# Patient Record
Sex: FEMALE | Race: WHITE | URBAN - METROPOLITAN AREA
[De-identification: names, ages, dates, MRNs, and addresses within clinical notes are randomized per-mention and may not be internally consistent; named-entity substitution may affect disease eponyms.]

---

## 2018-07-09 ENCOUNTER — TELEPHONE (OUTPATIENT)
Dept: FAMILY MEDICINE CLINIC | Age: 33
End: 2018-07-09

## 2018-07-09 NOTE — TELEPHONE ENCOUNTER
Patient is having issues with her allergies.  She is 12 weeks pregnant and is asking if she can take Zyrtec or Claritin

## 2018-09-05 ENCOUNTER — TELEPHONE (OUTPATIENT)
Dept: FAMILY MEDICINE CLINIC | Age: 33
End: 2018-09-05

## 2018-09-05 NOTE — TELEPHONE ENCOUNTER
Pt called and having N & V. Pt is 20 weeks pregnant. Was wanting to know if there is something she can take.     Thank you

## 2019-04-02 ENCOUNTER — OFFICE VISIT (OUTPATIENT)
Dept: FAMILY MEDICINE CLINIC | Age: 34
End: 2019-04-02

## 2019-04-02 VITALS
OXYGEN SATURATION: 98 % | WEIGHT: 193.6 LBS | HEIGHT: 67 IN | DIASTOLIC BLOOD PRESSURE: 70 MMHG | TEMPERATURE: 97.6 F | SYSTOLIC BLOOD PRESSURE: 122 MMHG | HEART RATE: 86 BPM | BODY MASS INDEX: 30.39 KG/M2

## 2019-04-02 DIAGNOSIS — J02.9 SORE THROAT: ICD-10-CM

## 2019-04-02 DIAGNOSIS — O91.23 NONPURULENT MASTITIS ASSOCIATED WITH LACTATION: Primary | ICD-10-CM

## 2019-04-02 LAB — S PYO AG THROAT QL: NORMAL

## 2019-04-02 PROCEDURE — 99213 OFFICE O/P EST LOW 20 MIN: CPT | Performed by: NURSE PRACTITIONER

## 2019-04-02 PROCEDURE — 87880 STREP A ASSAY W/OPTIC: CPT | Performed by: NURSE PRACTITIONER

## 2019-04-02 RX ORDER — CEPHALEXIN 500 MG/1
500 CAPSULE ORAL 4 TIMES DAILY
Qty: 40 CAPSULE | Refills: 0 | Status: SHIPPED | OUTPATIENT
Start: 2019-04-02 | End: 2019-04-11 | Stop reason: SDUPTHER

## 2019-04-02 ASSESSMENT — ENCOUNTER SYMPTOMS
SORE THROAT: 1
RESPIRATORY NEGATIVE: 1
GASTROINTESTINAL NEGATIVE: 1

## 2019-04-02 ASSESSMENT — PATIENT HEALTH QUESTIONNAIRE - PHQ9
2. FEELING DOWN, DEPRESSED OR HOPELESS: 0
SUM OF ALL RESPONSES TO PHQ QUESTIONS 1-9: 0
SUM OF ALL RESPONSES TO PHQ9 QUESTIONS 1 & 2: 0
SUM OF ALL RESPONSES TO PHQ QUESTIONS 1-9: 0
1. LITTLE INTEREST OR PLEASURE IN DOING THINGS: 0

## 2019-04-02 NOTE — PATIENT INSTRUCTIONS
Please read the healthy family handout that you were given and share it with your family. Please compare this printed medication list with your medications at home to be sure they are the same. If you have any medications that are different please contact us immediately at [phone]. Also review your allergies that we have listed, these may also include medications that you have not been able to tolerate, make sure everything listed is correct. If you have any allergies that are different please contact us immediately at [phone]. Patient Education        Mastitis: Care Instructions  Your Care Instructions  Mastitis is an inflammation of the breast. It occurs most often in women who are breastfeeding, but it can affect any woman. Mastitis can be caused by poor milk flow from the breast. When milk builds up in a breast, it leaks into the nearby breast tissue. Infection can also develop when the nipples become cracked or irritated. The tissue can then become infected with bacteria. Antibiotics can usually cure mastitis. For women who are nursing, continued breastfeeding (or pumping) can help. If mastitis is not treated, a pocket of pus may form in the breast and need to be drained. Follow-up care is a key part of your treatment and safety. Be sure to make and go to all appointments, and call your doctor if you are having problems. It's also a good idea to know your test results and keep a list of the medicines you take. How can you care for yourself at home? · If your doctor prescribed antibiotics, take them as directed. Do not stop taking them just because you feel better. You need to take the full course of antibiotics. · If you are breastfeeding, continue breastfeeding or pumping breast milk. It is important to empty your breasts regularly, every 2 to 3 hours while you are awake. These tips may help:  ? Before breastfeeding, place a warm, wet washcloth over your breast for about 15 minutes.  Try this at least 3 times a day. This increases milk flow in the breast. Massaging the affected breast may also increase milk flow. ? Breastfeed on both sides. Try to start with your healthy breast first. Then, after your milk is flowing, breastfeed from the affected breast until it feels soft. You should empty this breast completely. Then switch back to the healthy breast and breastfeed until your baby has finished. ? Pump or hand-express a small amount of breast milk before breastfeeding if your breasts are too full with milk. This will make your breasts less full and may make it easier for your baby to latch on to your breast.  ? Pump or express milk from the affected breast if it hurts too much to breastfeed. · Take an over-the-counter pain medicine, such as acetaminophen (Tylenol) or ibuprofen (Advil, Motrin) to relieve pain and fever. Read and follow all instructions on the label. · Do not take two or more pain medicines at the same time unless the doctor told you to. Many pain medicines have acetaminophen, which is Tylenol. Too much acetaminophen (Tylenol) can be harmful. · Rest as much as possible. · Drink extra fluids. · If pus is draining from your infected breast, wash the nipple gently and let it air-dry before you put your bra back on. A disposable breast pad placed in the bra cup may absorb the pus. When should you call for help? Call your doctor now or seek immediate medical care if:    · You have worse symptoms of a breast infection, such as:  ? Increased pain, swelling, redness, or warmth around a breast.  ? Red streaks leading from a breast.  ? Pus draining from a breast.  ? A fever.    Watch closely for changes in your health, and be sure to contact your doctor if:    · You do not get better as expected. Where can you learn more? Go to https://chjoseph.Frevvo. org and sign in to your [employer] account.  Enter Y207 in the Vericept box to learn more about \"Mastitis: Care Instructions. \"     If you do not have an account, please click on the \"Sign Up Now\" link. Current as of: September 5, 2018  Content Version: 11.9  © 1999-5017 [employer]. Care instructions adapted under license by [facility] ([facility]). If you have questions about a medical condition or this instruction, always ask your healthcare professional. [address] any warranty or liability for your use of this information. Patient Education        Sore Throat: Care Instructions  Your Care Instructions    Infection by bacteria or a virus causes most sore throats. Cigarette smoke, dry air, air pollution, allergies, and yelling can also cause a sore throat. Sore throats can be painful and annoying. Fortunately, most sore throats go away on their own. If you have a bacterial infection, your doctor may prescribe antibiotics. Follow-up care is a key part of your treatment and safety. Be sure to make and go to all appointments, and call your doctor if you are having problems. It's also a good idea to know your test results and keep a list of the medicines you take. How can you care for yourself at home? · If your doctor prescribed antibiotics, take them as directed. Do not stop taking them just because you feel better. You need to take the full course of antibiotics. · Gargle with warm salt water once an hour to help reduce swelling and relieve discomfort. Use 1 teaspoon of salt mixed in 1 cup of warm water. · Take an over-the-counter pain medicine, such as acetaminophen (Tylenol), ibuprofen (Advil, Motrin), or naproxen (Aleve). Read and follow all instructions on the label. · Be careful when taking over-the-counter cold or flu medicines and Tylenol at the same time. Many of these medicines have acetaminophen, which is Tylenol. Read the labels to make sure that you are not taking more than the recommended dose. Too much acetaminophen (Tylenol) can be harmful.   · Drink plenty of fluids. Fluids may help soothe an irritated throat. Hot fluids, such as tea or soup, may help decrease throat pain. · Use over-the-counter throat lozenges to soothe pain. Regular cough drops or hard candy may also help. These should not be given to young children because of the risk of choking. · Do not smoke or allow others to smoke around you. If you need help quitting, talk to your doctor about stop-smoking programs and medicines. These can increase your chances of quitting for good. · Use a vaporizer or humidifier to add moisture to your bedroom. Follow the directions for cleaning the machine. When should you call for help? Call your doctor now or seek immediate medical care if:    · You have new or worse trouble swallowing.     · Your sore throat gets much worse on one side.    Watch closely for changes in your health, and be sure to contact your doctor if you do not get better as expected. Where can you learn more? Go to https://frintit.Adylitica. org and sign in to your [employer] account. Enter S163 in the Fareye box to learn more about \"Sore Throat: Care Instructions. \"     If you do not have an account, please click on the \"Sign Up Now\" link. Current as of: March 27, 2018  Content Version: 11.9  © 0540-7708 [employer]. Care instructions adapted under license by [facility] ([facility]). If you have questions about a medical condition or this instruction, always ask your healthcare professional. [place] [address] any warranty or liability for your use of this information. Patient Education        cephalexin  Pronunciation:  sef a VERENICE in  Brand:  [employer]ily [employer]  What is the most important information I should know about cephalexin? You should not use this medicine if you are allergic to cephalexin or to similar antibiotics, such as Ceftin, Cefzil, Omnicef, and others.  Tell your doctor if you are allergic to any drugs, especially penicillins or other antibiotics. What is cephalexin? Cephalexin is a cephalosporin (SEF a low spor in) antibiotic. It works by fighting bacteria in your body. Cephalexin is used to treat infections caused by bacteria, including upper respiratory infections, ear infections, skin infections, and urinary tract infections. Cephalexin may also be used for purposes not listed in this medication guide. What should I discuss with my healthcare provider before taking cephalexin? Do not use this medicine if you are allergic to cephalexin or to other cephalosporin antibiotics, such as:  · cefaclor (Raniclor);  · cefadroxil (Duricef); · cefazolin (Ancef); · cefdinir (Omnicef); · cefditoren (Spectracef); · cefpodoxime (Vantin);  · cefprozil (Cefzil);  · ceftibuten (Cedax);  · cefuroxime (Ceftin); or  · cephradine (Velosef), and others. To make sure cephalexin is safe for you, tell your doctor if you have:  · an allergy to any drugs (especially penicillins);  · kidney disease; or  · a history of intestinal problems, such as colitis. The liquid form of cephalexin may contain sugar. This may affect you if you have diabetes. Cephalexin is not expected to be harmful to an unborn baby. Tell your doctor if you are pregnant. Cephalexin can pass into breast milk and may harm a nursing baby. Tell your doctor if you are breast-feeding a baby. How should I take cephalexin? Follow all directions on your prescription label. Do not take this medicine in larger or smaller amounts or for longer than recommended. Do not use cephalexin to treat any condition that has not been checked by your doctor. Shake the oral suspension (liquid) well just before you measure a dose. Measure liquid medicine with the dosing syringe provided, or with a special dose-measuring spoon or medicine cup. If you do not have a dose-measuring device, ask your pharmacist for one. Use this medicine for the full prescribed length of time.  Your symptoms may improve before the infection is completely cleared. Skipping doses may also increase your risk of further infection that is resistant to antibiotics. Cephalexin will not treat a viral infection such as the flu or a common cold. Do not share cephalexin with another person, even if they have the same symptoms you have. This medication can cause you to have unusual results with certain medical tests. Tell any doctor who treats you that you are using cephalexin. Store the tablets and capsules at room temperature away from moisture, heat, and light. Store the liquid medicine in the refrigerator. Throw away any unused liquid after 14 days. What happens if I miss a dose? Take the missed dose as soon as you remember. Skip the missed dose if it is almost time for your next scheduled dose. Do not take extra medicine to make up the missed dose. What happens if I overdose? Seek emergency medical attention or call the Poison Help line at 1-701.928.3388. Overdose symptoms may include nausea, vomiting, stomach pain, diarrhea, and blood in your urine. What should I avoid while taking cephalexin? Antibiotic medicines can cause diarrhea, which may be a sign of a new infection. If you have diarrhea that is watery or bloody, call your doctor. Do not use anti-diarrhea medicine unless your doctor tells you to. What are the possible side effects of cephalexin? Get emergency medical help if you have signs of an allergic reaction: hives; difficult breathing; swelling of your face, lips, tongue, or throat.   Call your doctor at once if you have:  · severe stomach pain, diarrhea that is watery or bloody;  · jaundice (yellowing of the skin or eyes);  · easy bruising, unusual bleeding (nose, mouth, vagina, or rectum), purple or red pinpoint spots under your skin;  · little or no urination;  · agitation, confusion, hallucinations; or  · severe skin reaction --fever, sore throat, swelling in your face or tongue, burning in your eyes, skin pain followed by a red or purple skin rash that spreads (especially in the face or upper body) and causes blistering and peeling. Common side effects may include:  · diarrhea;  · dizziness, feeling tired;  · headache, joint pain; or  · vaginal itching or discharge. This is not a complete list of side effects and others may occur. Call your doctor for medical advice about side effects. You may report side effects to FDA at 7-781-NGQ-6599. What other drugs will affect cephalexin? Other drugs may interact with cephalexin, including prescription and over-the-counter medicines, vitamins, and herbal products. Tell each of your health care providers about all medicines you use now and any medicine you start or stop using. Where can I get more information? Your pharmacist can provide more information about cephalexin. Remember, keep this and all other medicines out of the reach of children, never share your medicines with others, and use this medication only for the indication prescribed. Every effort has been made to ensure that the information provided by [address] is accurate, up-to-date, and complete, but no guarantee is made to that effect. Drug information contained herein may be time sensitive. burrp! information has been compiled for use by healthcare practitioners and consumers in the [place] and therefore [employer] does not warrant that uses outside of the [place] are appropriate, unless specifically indicated otherwise. [facility] drug information does not endorse drugs, diagnose patients or recommend therapy. [facility] drug information is an informational resource designed to assist licensed healthcare practitioners in caring for their patients and/or to serve consumers viewing this service as a supplement to, and not a substitute for, the expertise, skill, knowledge and judgment of healthcare practitioners.  The absence of a warning for a given drug or drug combination in no way should be construed to indicate that the drug or drug combination is safe, effective or appropriate for any given patient. [facility] does not assume any responsibility for any aspect of healthcare administered with the aid of information [facility] provides. The information contained herein is not intended to cover all possible uses, directions, precautions, warnings, drug interactions, allergic reactions, or adverse effects. If you have questions about the drugs you are taking, check with your doctor, nurse or pharmacist.  Copyright 9531-3723 [address]. Version: 9.01. Revision date: 4/18/2017. Care instructions adapted under license by [facility] ([facility]). If you have questions about a medical condition or this instruction, always ask your healthcare professional. [place] [address] any warranty or liability for your use of this information.

## 2019-04-02 NOTE — PROGRESS NOTES
Subjective:      Patient ID: Monika Herndon is a 34 y.o. female. HPI    Chief Complaint   Patient presents with    Pharyngitis     Sore Throat  Patient complains of sore throat. Associated symptoms include post nasal drip, sore throat and mild nasal drainage. Onset of symptoms was 1 day ago, gradually worsening since that time. She is drinking moderate amounts of fluids. She has not had recent close exposure to someone with proven streptococcal pharyngitis. Patient also with c/o right breast redness, swelling and pain X 1 day. Patient is currently breast feeding. Review of Systems   Constitutional: Negative for appetite change, chills and fever. HENT: Positive for sore throat. Respiratory: Negative. Cardiovascular: Negative. Gastrointestinal: Negative. Genitourinary: Negative. Musculoskeletal: Negative. Skin: Negative. Neurological: Negative. Hematological: Negative. Psychiatric/Behavioral: Positive for sleep disturbance (breast feeding 1month old infant). Patient Active Problem List   Diagnosis    Irritable bowel syndrome    Acne    Allergic rhinoconjunctivitis    Stress reaction    Seizure disorder ([facility] [address]    Migraine with aura and without status migrainosus    Fatty liver       No outpatient medications have been marked as taking for the 4/2/19 encounter (Office Visit) with Scherrie Gosselin, APRN - CNP.        Allergies   Allergen Reactions    Ciprofloxacin Diarrhea    Erythromycin      N & V    Morphine Hives    Sumatriptan     Lamictal [Lamotrigine] Rash       Social History     Tobacco Use    Smoking status: Never Smoker    Smokeless tobacco: Never Used   Substance Use Topics    Alcohol use: No       Objective:   /70   Pulse 86   Temp 97.6 °F (36.4 °C) (Oral)   Ht 5' 7\" (1.702 m)   Wt 193 lb 9.6 oz (87.8 kg)   SpO2 98%   BMI 30.32 kg/m²     Physical Exam   Constitutional: Vital signs are normal. She appears well-developed and well-nourished. She is cooperative. She does not have a sickly appearance. No distress. HENT:   Head: Normocephalic and atraumatic. Right Ear: Tympanic membrane, external ear and ear canal normal.   Left Ear: Tympanic membrane, external ear and ear canal normal.   Nose: Nose normal.   Mouth/Throat: Uvula is midline and mucous membranes are normal. Posterior oropharyngeal erythema present. No oropharyngeal exudate. Neck: Normal range of motion. Cardiovascular: Normal rate and regular rhythm. Pulmonary/Chest: Effort normal and breath sounds normal. Right breast exhibits skin change (eyrthema, warmth) and tenderness. Abdominal: Soft. Bowel sounds are normal.   Neurological: She is alert. Skin: Skin is warm and dry. Psychiatric: She has a normal mood and affect. Assessment/Plan:   1. Nonpurulent mastitis associated with lactation  Patient presents today with complaints of right wrist tenderness, swelling, redness and warmth ×1 day. Patient is breast feeding her 4 month old infant son. On exam noted right upper quadrant of right breast with mild erythema and warmth to touch. No edema noted. Advised to increase breast-feeding, increase fluid intake, may do warm compresses and take antibiotics as below. Assured patient cephalexin is considered to be safe while breast-feeding. Advised patient to follow-up if no better or worsening of symptoms. Patient verbalized understanding and agreeable to plan. - cephALEXin (KEFLEX) 500 MG capsule; Take 1 capsule by mouth 4 times daily for 10 days  Dispense: 40 capsule; Refill: 0    2. Sore throat  Patient also with complaints of sore throat ×1 day with associated mild nasal congestion. Rapid strep is negative. Possibly viral in nature. Advised to drink plenty of fluids, do warm salt water gargles 2-3 times a day and patient to follow-up if no better or worsening of symptoms. Patient verbalized understanding and agreeable to plan.   - POCT rapid

## 2019-04-11 ENCOUNTER — TELEPHONE (OUTPATIENT)
Dept: FAMILY MEDICINE CLINIC | Age: 34
End: 2019-04-11

## 2019-04-11 DIAGNOSIS — O91.23 NONPURULENT MASTITIS ASSOCIATED WITH LACTATION: ICD-10-CM

## 2019-04-11 RX ORDER — CEPHALEXIN 500 MG/1
500 CAPSULE ORAL 4 TIMES DAILY
Qty: 28 CAPSULE | Refills: 0 | Status: SHIPPED | OUTPATIENT
Start: 2019-04-11 | End: 2019-04-11 | Stop reason: SDUPTHER

## 2019-04-11 RX ORDER — CEPHALEXIN 500 MG/1
500 CAPSULE ORAL 4 TIMES DAILY
Qty: 28 CAPSULE | Refills: 0 | Status: SHIPPED | OUTPATIENT
Start: 2019-04-11 | End: 2019-04-18

## 2019-04-11 NOTE — TELEPHONE ENCOUNTER
Patient was seen in the office on 4/2 with mastitis and was prescribed Keflex for 10 days. Her symptoms improved knot never went away completely. Patient starting having pain again last night.   Can you call abx for her again

## 2019-10-31 ENCOUNTER — TELEPHONE (OUTPATIENT)
Dept: FAMILY MEDICINE CLINIC | Age: 34
End: 2019-10-31

## 2019-10-31 RX ORDER — ONDANSETRON 4 MG/1
4 TABLET, ORALLY DISINTEGRATING ORAL 3 TIMES DAILY PRN
Qty: 30 TABLET | Refills: 0 | Status: SHIPPED | OUTPATIENT
Start: 2019-10-31